# Patient Record
Sex: FEMALE
[De-identification: names, ages, dates, MRNs, and addresses within clinical notes are randomized per-mention and may not be internally consistent; named-entity substitution may affect disease eponyms.]

---

## 2018-04-03 ENCOUNTER — HOSPITAL ENCOUNTER (OUTPATIENT)
Dept: HOSPITAL 5 - SPVWC | Age: 53
Discharge: HOME | End: 2018-04-03
Attending: FAMILY MEDICINE
Payer: COMMERCIAL

## 2018-04-03 DIAGNOSIS — Z12.31: Primary | ICD-10-CM

## 2018-04-03 PROCEDURE — 77067 SCR MAMMO BI INCL CAD: CPT

## 2018-04-04 NOTE — MAMMOGRAPHY REPORT
BILATERAL DIGITAL SCREENING MAMMOGRAM WITH CAD:04/03/18 00:00:00



CLINICAL: Baseline screening.



FINDINGS: The breasts are mostly fatty with a few bilateral scattered 

fibroglandular densities.No mass, architectural distortion or 

suspicious calcifications.



IMPRESSION: No mammographic evidence of malignancy.



BI-RADS CATEGORY:  1 -- Negative



RECOMMENDATION: Routine mammographic screening in one year.





ACR BI-RADS MAMMOGRAPHIC CODES:

0 = Needs additional imaging evaluation; 1 = Negative; 2 = Benign; 3 = 

Probably benign; 4 = Suspicious; 5 = Malignant; 6 = Known biopsy-proven 

malignancy



COMMENT:

      1.   Dense breast tissue, i.e., adenosis, fibrocystic 

            changes, etc., may obscure an underlying neoplasm.

      2.   Approximately 10% of cancers are not detected with

            mammography.

      3.   A negative mammography report should not delay biopsy 

            if a clinically suspicious mass is present.

## 2022-08-23 ENCOUNTER — HOSPITAL ENCOUNTER (INPATIENT)
Dept: HOSPITAL 5 - ED | Age: 57
LOS: 3 days | Discharge: HOME | DRG: 286 | End: 2022-08-26
Attending: INTERNAL MEDICINE | Admitting: INTERNAL MEDICINE
Payer: COMMERCIAL

## 2022-08-23 DIAGNOSIS — E66.01: ICD-10-CM

## 2022-08-23 DIAGNOSIS — E78.5: ICD-10-CM

## 2022-08-23 DIAGNOSIS — Z87.891: ICD-10-CM

## 2022-08-23 DIAGNOSIS — F41.9: ICD-10-CM

## 2022-08-23 DIAGNOSIS — R07.89: Primary | ICD-10-CM

## 2022-08-23 DIAGNOSIS — I42.2: ICD-10-CM

## 2022-08-23 DIAGNOSIS — K21.9: ICD-10-CM

## 2022-08-23 DIAGNOSIS — F32.A: ICD-10-CM

## 2022-08-23 DIAGNOSIS — Z82.49: ICD-10-CM

## 2022-08-23 DIAGNOSIS — I11.0: ICD-10-CM

## 2022-08-23 DIAGNOSIS — I50.31: ICD-10-CM

## 2022-08-23 DIAGNOSIS — J45.909: ICD-10-CM

## 2022-08-23 PROCEDURE — 86850 RBC ANTIBODY SCREEN: CPT

## 2022-08-23 PROCEDURE — 80061 LIPID PANEL: CPT

## 2022-08-23 PROCEDURE — 85025 COMPLETE CBC W/AUTO DIFF WBC: CPT

## 2022-08-23 PROCEDURE — C1894 INTRO/SHEATH, NON-LASER: HCPCS

## 2022-08-23 PROCEDURE — 84443 ASSAY THYROID STIM HORMONE: CPT

## 2022-08-23 PROCEDURE — 82962 GLUCOSE BLOOD TEST: CPT

## 2022-08-23 PROCEDURE — 80053 COMPREHEN METABOLIC PANEL: CPT

## 2022-08-23 PROCEDURE — 80048 BASIC METABOLIC PNL TOTAL CA: CPT

## 2022-08-23 PROCEDURE — 71046 X-RAY EXAM CHEST 2 VIEWS: CPT

## 2022-08-23 PROCEDURE — 85610 PROTHROMBIN TIME: CPT

## 2022-08-23 PROCEDURE — 86901 BLOOD TYPING SEROLOGIC RH(D): CPT

## 2022-08-23 PROCEDURE — 71250 CT THORAX DX C-: CPT

## 2022-08-23 PROCEDURE — 36415 COLL VENOUS BLD VENIPUNCTURE: CPT

## 2022-08-23 PROCEDURE — 83880 ASSAY OF NATRIURETIC PEPTIDE: CPT

## 2022-08-23 PROCEDURE — 99285 EMERGENCY DEPT VISIT HI MDM: CPT

## 2022-08-23 PROCEDURE — 86900 BLOOD TYPING SEROLOGIC ABO: CPT

## 2022-08-23 PROCEDURE — 85730 THROMBOPLASTIN TIME PARTIAL: CPT

## 2022-08-23 PROCEDURE — 93458 L HRT ARTERY/VENTRICLE ANGIO: CPT

## 2022-08-23 PROCEDURE — 84439 ASSAY OF FREE THYROXINE: CPT

## 2022-08-23 PROCEDURE — C8929 TTE W OR WO FOL WCON,DOPPLER: HCPCS

## 2022-08-23 PROCEDURE — 81001 URINALYSIS AUTO W/SCOPE: CPT

## 2022-08-23 PROCEDURE — 36600 WITHDRAWAL OF ARTERIAL BLOOD: CPT

## 2022-08-23 PROCEDURE — 93005 ELECTROCARDIOGRAM TRACING: CPT

## 2022-08-23 PROCEDURE — 84484 ASSAY OF TROPONIN QUANT: CPT

## 2022-08-23 PROCEDURE — 82803 BLOOD GASES ANY COMBINATION: CPT

## 2022-08-23 PROCEDURE — 94644 CONT INHLJ TX 1ST HOUR: CPT

## 2022-08-23 PROCEDURE — 94640 AIRWAY INHALATION TREATMENT: CPT

## 2022-08-23 PROCEDURE — 93306 TTE W/DOPPLER COMPLETE: CPT

## 2022-08-23 NOTE — EMERGENCY DEPARTMENT REPORT
ED Chest Pain HPI





- General


Stated Complaint: DR REFERRAL/SOB AND WHEEZING


PUI?: No


Time Seen by Provider: 08/23/22 16:26


Source: patient


Mode of arrival: Ambulatory


Limitations: No Limitations





- History of Present Illness


Initial Comments: 





Dr Carlo [place]





57 yo comes in with a/c sob- worsening


diaphoretic


n/v- thought was gerd


obese





has card appnt 





was given inhalers for ? asthma





post menopausal





co for ACS


MD Complaint: chest pain





Heart Score





- HEART Score


History: Moderately suspicious


EKG: Non-specific


Age: 45-65


Risk factors: 1-2 risk factors


Troponin: < normal limit


HEART Score: 4





- EKG Read Time


Time EKG Completed: 16:30


EKG Read Time: 16:30





- Critical Actions


Critical Actions: 4-6 pts:12-16.6% risk of adverse cardiac event. Should be 

admitted





ED Review of Systems


ROS: 


Stated complaint: DR REFERRAL/SOB AND WHEEZING


Other details as noted in HPI








ED Past Medical Hx





- Past Medical History


Previous Medical History?: Yes


Hx Asthma: Yes


Additional medical history: obese- anxiety/depression





- Family History


Family history: other (dad dec lung ds/mom pos cad)





- Social History


Smoking Status: Former Smoker


Substance Use Type: Alcohol





ED Physical Exam





- General


Limitations: No Limitations


General appearance: alert, in no apparent distress





- Head


Head exam: Present: atraumatic, normocephalic





- Eye


Eye exam: Present: normal appearance





- ENT


ENT exam: Present: mucous membranes moist





- Neck


Neck exam: Present: normal inspection





- Respiratory


Respiratory exam: Present: normal lung sounds bilaterally.  Absent: respiratory 

distress





- Cardiovascular


Cardiovascular Exam: Present: regular rate, normal rhythm.  Absent: systolic 

murmur, diastolic murmur, rubs, gallop





- GI/Abdominal


GI/Abdominal exam: Present: soft, normal bowel sounds





- Extremities Exam


Extremities exam: Present: normal inspection





- Back Exam


Back exam: Present: normal inspection





- Neurological Exam


Neurological exam: Present: alert, oriented X3





- Psychiatric


Psychiatric exam: Present: normal affect, normal mood





- Skin


Skin exam: Present: warm, dry, intact, normal color.  Absent: rash


Critical care attestation.: 


If time is entered above; I have spent that time in minutes in the direct care 

of this critically ill patient, excluding procedure time.








ED Disposition


Clinical Impression: 


 Chest pain





Disposition: 30 STILL A PATIENT


Is pt being admited?: No


Does the pt Need Aspirin: No


Condition: Stable


Instructions:  Nonspecific Chest Pain, Adult

## 2022-08-23 NOTE — XRAY REPORT
CHEST 2 VIEWS 



INDICATION / CLINICAL INFORMATION:

Chest Pain.



COMPARISON: 

None available.



FINDINGS:



SUPPORT DEVICES: None.



HEART / MEDIASTINUM: No significant abnormality. 



LUNGS / PLEURA: No significant pulmonary or pleural abnormality. No pneumothorax. 



ADDITIONAL FINDINGS: No significant additional findings.



IMPRESSION:

1. No acute findings.



Signer Name: Dru Sinha MD 

Signed: 8/23/2022 5:05 PM

Workstation Name: [email or web address]

## 2022-08-24 LAB
ALBUMIN SERPL-MCNC: 4.7 G/DL (ref 3.9–5)
ALT SERPL-CCNC: 29 UNITS/L (ref 7–56)
APTT PPP: (no result) S
BACTERIA #/AREA URNS HPF: (no result) /HPF
BASOPHILS # (AUTO): 0.1 K/MM3 (ref 0–0.1)
BASOPHILS NFR BLD AUTO: 0.9 % (ref 0–1.8)
BUN SERPL-MCNC: 16 MG/DL (ref 7–17)
BUN/CREAT SERPL: 20 %
CALCIUM SERPL-MCNC: 9.6 MG/DL (ref 8.4–10.2)
EOSINOPHIL # BLD AUTO: 0.3 K/MM3 (ref 0–0.4)
EOSINOPHIL NFR BLD AUTO: 4.1 % (ref 0–4.3)
HCT VFR BLD CALC: 46.4 % (ref 30.3–42.9)
HEMOLYSIS INDEX: 16
HGB BLD-MCNC: 15.4 GM/DL (ref 10.1–14.3)
LYMPHOCYTES # BLD AUTO: 2.1 K/MM3 (ref 1.2–5.4)
LYMPHOCYTES NFR BLD AUTO: 27.3 % (ref 13.4–35)
MCHC RBC AUTO-ENTMCNC: 33 % (ref 30–34)
MCV RBC AUTO: 83 FL (ref 79–97)
MONOCYTES # (AUTO): 1.1 K/MM3 (ref 0–0.8)
MONOCYTES % (AUTO): 14.9 % (ref 0–7.3)
MUCOUS THREADS #/AREA URNS HPF: (no result) /HPF
PLATELET # BLD: 247 K/MM3 (ref 140–440)
RBC # BLD AUTO: 5.57 M/MM3 (ref 3.65–5.03)
RBC #/AREA URNS HPF: 8 /HPF (ref 0–6)
WBC #/AREA URNS HPF: 1 /HPF (ref 0–6)

## 2022-08-24 RX ADMIN — ENOXAPARIN SODIUM SCH MG: 100 INJECTION SUBCUTANEOUS at 21:58

## 2022-08-24 RX ADMIN — Medication SCH ML: at 21:58

## 2022-08-24 RX ADMIN — FAMOTIDINE SCH MG: 10 INJECTION, SOLUTION INTRAVENOUS at 21:58

## 2022-08-24 NOTE — EMERGENCY DEPARTMENT REPORT
ED General Adult HPI





- General


Chief complaint: Chest Pain


Stated complaint: DR REFERRAL/SOB AND WHEEZING


PUI?: No


Time Seen by Provider: 08/23/22 16:26


Source: patient


Mode of arrival: Ambulatory


Limitations: No Limitations





- History of Present Illness


Initial comments: 





pt reports she was sent by her MD for SOB and chest pain. pt reports has has 

intermittent SOB that she is treated for with an MDI. pt reports the SOB has 

been getting worse and more frequent and she is now having episodes of chest 

pain. pt reports she breaks out in a sweat while at rest sometimes. pt report 

the chest pain is left anterior chest. 


-: Gradual, month(s)


Severity scale (0 -10): 3


Consistency: intermittent


Improves with: none


Associated Symptoms: cough, shortness of breath.  denies: fever/chills


Treatments Prior to Arrival: none





- Related Data


                                  Previous Rx's











 Medication  Instructions  Recorded  Last Taken  Type


 


Azithromycin [Zithromax Z-GOPAL] 250 mg PO DAILY #6 08/24/22 Unknown Rx


 


Brompheniramine/Pseudoephed/Dm 5 ml PO Q6HR PRN #120 syrup 08/24/22 Unknown Rx





[Bromfed Dm Cough Syrup]    


 


methylPREDNISolone [Medrol 4MG 4 mg PO DAILY #1 08/24/22 Unknown Rx





DOSEPAK (21 tabs)]    











                                    Allergies











Allergy/AdvReac Type Severity Reaction Status Date / Time


 


No Known Allergies Allergy   Verified 08/23/22 16:35














ED Review of Systems


ROS: 


Stated complaint: DR REFERRAL/SOB AND WHEEZING


Other details as noted in HPI





Constitutional: denies: chills, fever


Eyes: denies: eye pain, eye discharge, vision change


ENT: denies: ear pain, throat pain


Respiratory: denies: cough, shortness of breath, wheezing


Cardiovascular: denies: chest pain, palpitations


Endocrine: no symptoms reported


Gastrointestinal: denies: abdominal pain, nausea, diarrhea


Genitourinary: denies: urgency, dysuria, discharge


Musculoskeletal: denies: back pain, joint swelling, arthralgia


Skin: denies: rash, lesions


Neurological: denies: headache, weakness, paresthesias


Psychiatric: denies: anxiety, depression


Hematological/Lymphatic: denies: easy bleeding, easy bruising





ED Past Medical Hx





- Past Medical History


Previous Medical History?: Yes


Hx Hypertension: No


Hx CVA: No


Hx Asthma: Yes


Additional medical history: obese- anxiety/depression





- Social History


Smoking Status: Former Smoker


Substance Use Type: Alcohol





- Medications


Home Medications: 


                                Home Medications











 Medication  Instructions  Recorded  Confirmed  Last Taken  Type


 


Azithromycin [Zithromax Z-GOPAL] 250 mg PO DAILY #6 08/24/22  Unknown Rx


 


Brompheniramine/Pseudoephed/Dm 5 ml PO Q6HR PRN #120 syrup 08/24/22  Unknown Rx





[Bromfed Dm Cough Syrup]     


 


methylPREDNISolone [Medrol 4MG 4 mg PO DAILY #1 08/24/22  Unknown Rx





DOSEPAK (21 tabs)]     














ED Physical Exam





- General


Limitations: No Limitations


General appearance: alert, in no apparent distress





- Head


Head exam: Present: atraumatic, normocephalic





- Eye


Eye exam: Present: normal appearance





- ENT


ENT exam: Present: mucous membranes moist





- Neck


Neck exam: Present: normal inspection





- Respiratory


Respiratory exam: Present: normal lung sounds bilaterally, wheezes.  Absent: 

respiratory distress





- Cardiovascular


Cardiovascular Exam: Present: regular rate, normal rhythm.  Absent: systolic 

murmur, diastolic murmur, rubs, gallop





- GI/Abdominal


GI/Abdominal exam: Present: soft, normal bowel sounds





- Extremities Exam


Extremities exam: Present: normal inspection





- Back Exam


Back exam: Present: normal inspection





- Neurological Exam


Neurological exam: Present: alert, oriented X3





- Psychiatric


Psychiatric exam: Present: normal affect, normal mood





- Skin


Skin exam: Present: warm, dry, intact, normal color.  Absent: rash





ED Course





                                   Vital Signs











  08/23/22 08/24/22





  16:27 07:56


 


Temperature 98.9 F 


 


Pulse Rate 102 H 


 


Pulse Rate [  106 H





Anterior  





Bilateral  





Throughout]  


 


Respiratory 26 H 





Rate  


 


Respiratory  20





Rate [Anterior  





Bilateral  





Throughout]  


 


Blood Pressure 209/105 





[Right]  


 


O2 Sat by Pulse 97 





Oximetry  














ED Medical Decision Making





- Lab Data


Result diagrams: 


                                 08/24/22 02:08





                                 08/23/22 23:40





- EKG Data


-: EKG Interpreted by Me


EKG shows normal: sinus rhythm





- EKG Data


Interpretation: LVH





- Radiology Data


Radiology results: report reviewed, image reviewed





- Medical Decision Making





work up showed normal x ray , BP controlled rt given , will refer to card for 

echo rule out HF , and pulmonary to be evaluated for COPD chornic brnchitis 


Critical care attestation.: 


If time is entered above; I have spent that time in minutes in the direct care 

of this critically ill patient, excluding procedure time.








ED Disposition


Clinical Impression: 


 SOB (shortness of breath), Bronchitis, Uncontrolled hypertension





Disposition: 01 HOME / SELF CARE / HOMELESS


Is pt being admited?: No


Does the pt Need Aspirin: No


Condition: Stable


Instructions:  Nonspecific Chest Pain, Adult, Chronic Bronchitis (ED), 

Hypertension (ED), Preventing Hypertension, Shortness of Breath, Adult, 

Easy-to-Read, Upper Respiratory Infection, Adult


Referrals: 


PRIMARY CARE,MD [Primary Care Provider] - 3-5 Days


MAURA BOGGS MD [Staff Physician] - 3-5 Days

## 2022-08-24 NOTE — CONSULTATION
History of Present Illness


Consult date: 08/24/22


Requesting physician: KATHIE MCMAHON


Consult reason: chest pain, known to you


History of present illness: 





Patient is a 56-year-old female with a history of obesity, past tobacco use, and

family history of premature CAD who came to the ED after being sent by Dr. Moran

from a office due to abnormal EKG, shortness of breath, and chest pain that has 

been progressively worsening for several months.  Patient also reports 75 pound 

weight gain in the last year, dyspnea on exertion, and diaphoresis at time.  She

currently denies with apnea, PND nausea, vomiting, palpitations.  Cardiology is 

consulted for chest pain and shortness of breath.





Past History


Past Medical History: No medical history


Past Surgical History: No surgical history


Social history: smoking (Former 30-year smoker)


Family history: CAD





Medications and Allergies


                                    Allergies











Allergy/AdvReac Type Severity Reaction Status Date / Time


 


No Known Allergies Allergy   Verified 08/23/22 16:35











                                Home Medications











 Medication  Instructions  Recorded  Confirmed  Last Taken  Type


 


Azithromycin [Zithromax Z-GOPAL] 250 mg PO DAILY #6 08/24/22  Unknown Rx


 


Brompheniramine/Pseudoephed/Dm 5 ml PO Q6HR PRN #120 syrup 08/24/22  Unknown Rx





[Bromfed Dm Cough Syrup]     


 


methylPREDNISolone [Medrol 4MG 4 mg PO DAILY #1 08/24/22  Unknown Rx





DOSEPAK (21 tabs)]     











Active Meds: 


Active Medications





Acetaminophen (Acetaminophen 325 Mg Tab)  650 mg PO Q4H PRN


   PRN Reason: Pain MILD(1-3)/Fever >100.5/HA


Docusate Sodium (Docusate Sodium 100 Mg Cap)  100 mg PO BID PRN


   PRN Reason: Constipation


Famotidine (Famotidine 20 Mg/2 Ml Inj)  20 mg IV BID TAL


Sodium Chloride (Nacl 0.9% 500 Ml)  500 mls @ 50 mls/hr IV AS DIRECT TAL


   Stop: 08/24/22 21:59


Morphine Sulfate (Morphine 2 Mg/1 Ml Inj)  2 mg IV Q4H PRN


   PRN Reason: Pain, Moderate (4-6)


Morphine Sulfate (Morphine 4 Mg/1 Ml Inj)  4 mg IV Q4H PRN


   PRN Reason: Pain , Severe (7-10)


Naloxone HCl (Naloxone 0.4 Mg/1 Ml Inj)  0.1 mg IV Q2MIN PRN


   PRN Reason: Res Rate </= 8 or 02 SAT < 92%


Ondansetron HCl (Ondansetron 4 Mg/2 Ml Inj)  4 mg IV Q8H PRN


   PRN Reason: Nausea And Vomiting


Sodium Chloride (Sodium Chloride 0.9% 10 Ml Flush Syringe)  10 ml IV BID TAL


Sodium Chloride (Sodium Chloride 0.9% 10 Ml Flush Syringe)  10 ml IV PRN PRN


   PRN Reason: LINE FLUSH











Review of Systems


Constitutional: weight gain


Ears, nose, mouth and throat: no sinus pressure, no sinus pain


Cardiovascular: chest pain, shortness of breath, dyspnea on exertion, no 

orthopnea, no palpitations, no edema


Respiratory: shortness of breath, dyspnea on exertion


Gastrointestinal: no abdominal pain, no nausea, no vomiting


Musculoskeletal: no neck stiffness, no neck pain, no shooting arm pain


Integumentary: no rash, no pruritis, no redness


Neurological: no head injury, no transient paralysis


Psychiatric: no anxiety, no memory loss


Endocrine: no cold intolerance, no heat intolerance


Hematologic/Lymphatic: no easy bruising, no easy bleeding





Physical Examination


                                   Vital Signs











Temp Pulse Resp BP Pulse Ox


 


 98.9 F   102 H  26 H  209/105   97 


 


 08/23/22 16:27  08/23/22 16:27  08/23/22 16:27  08/23/22 16:27  08/23/22 16:27











General appearance: no acute distress


Neck: Positive: trachea midline


Cardiac: Positive: Reg Rate and Rhythm


Lungs: Positive: Normal Breath Sounds


Neuro: Positive: Grossly Intact


Abdomen: Positive: Soft


Skin: Negative: Rash, Suspicious Lesions, Ulceration


Extremities: Present: upper extr. pulses.  Absent: edema





Results





                                 08/24/22 02:08





                                 08/23/22 23:40


                                 Cardiac Enzymes











  08/23/22 Range/Units





  23:40 


 


AST  19  (5-40)  units/L








                                       CBC











  08/24/22 Range/Units





  02:08 


 


WBC  7.7  (4.5-11.0)  K/mm3


 


RBC  5.57 H  (3.65-5.03)  M/mm3


 


Hgb  15.4 H  (10.1-14.3)  gm/dl


 


Hct  46.4 H  (30.3-42.9)  %


 


Plt Count  247  (140-440)  K/mm3


 


Lymph # (Auto)  2.1  (1.2-5.4)  K/mm3


 


Mono # (Auto)  1.1 H  (0.0-0.8)  K/mm3


 


Eos # (Auto)  0.3  (0.0-0.4)  K/mm3


 


Baso # (Auto)  0.1  (0.0-0.1)  K/mm3








                          Comprehensive Metabolic Panel











  08/23/22 Range/Units





  23:40 


 


Sodium  142  (137-145)  mmol/L


 


Potassium  3.9  (3.6-5.0)  mmol/L


 


Chloride  99.3  ()  mmol/L


 


Carbon Dioxide  26  (22-30)  mmol/L


 


BUN  16  (7-17)  mg/dL


 


Creatinine  0.8  (0.6-1.2)  mg/dL


 


Glucose  224 H  ()  mg/dL


 


Calcium  9.6  (8.4-10.2)  mg/dL


 


AST  19  (5-40)  units/L


 


ALT  29  (7-56)  units/L


 


Alkaline Phosphatase  93  ()  units/L


 


Total Protein  7.2  (6.3-8.2)  g/dL


 


Albumin  4.7  (3.9-5)  g/dL














- Imaging and Cardiology


Echo: pending


Cardiac cath: pending





EKG interpretations





- Telemetry


EKG Rhythm: Sinus Rhythm





- EKG


Sinus rhythms and dysrhythmias: sinus rhythm


Chamber hypertrophy or enlargement: left ventricular hypertro





Assessment and Plan





Patient is a 56-year-old female with a history of obesity, past tobacco use, and

family history of premature CAD who came to the ED after being sent by Dr. Mroan

from a office due to abnormal EKG, shortness of breath, and chest pain that has 

been progressively worsening for several months





Chest pain


Shortness of breath


Bronchitis


Hypertension








Plan:


EKG shows sinus rhythm with LVH no acute ischemic changes.  Troponins negative 

x2.  AMI ruled out


BNP negative and patient appears euvolemic on exam.  Patient is not clinically 

in heart failure


Echo pending


Due to patient's progressively worsening symptoms we will plan for cardiac cath 

in the a.m.  Patient to be n.p.o. after midnight


Discussed plan of care with patient and family member who is at bedside both 

verbalized understanding and agreement





Patient seen in conjunction with Dr. Saldivar who agrees with this plan of care








- Patient Problems


(1) Chest pain


Current Visit: Yes   Status: Acute   





(2) Bronchitis


Current Visit: Yes   Status: Acute   





(3) SOB (shortness of breath)


Current Visit: Yes   Status: Acute   





(4) Uncontrolled hypertension


Current Visit: Yes   Status: Acute

## 2022-08-24 NOTE — HISTORY AND PHYSICAL REPORT
History of Present Illness


Date of examination: 22


Date of admission: 


2022


Chief complaint: 


Chest pain, shortness of breath and wheezing.


Sent by her MD for admission and further evaluation





History of present illness: 


56-year-old morbidly obese female patient with history of chronic tobacco use 

quit many years ago significant family history of premature coronary artery 

disease, was sent by her cardiologist to be admitted for further evaluation and 

management of her abnormal EKG and shortness of breath, in the setting of 

multiple risk factors, for further evaluation and possible left heart 

catheterization tomorrow.


Patient was evaluated by ER physician first 2 sets of cardiac enzymes were 

negative and EKG without any ST-T changes.


Chest x-ray no acute abnormality noted,At the time of my evaluation patient 

denies any chest pain or shortness of breath


Patient denies orthopnea or paroxysmal nocturnal dyspnea


Patient however complains of intermittent generalized sweating that last for few

minutes, not associated with chest pain, probably hot flashes








Past History


Past Medical History: No medical history


Past Surgical History:  (X3)


Social history: smoking (Quit many years ago), full code.  denies: alcohol 

abuse, prescription drug abuse


Family history: CAD





Medications and Allergies


                                    Allergies











Allergy/AdvReac Type Severity Reaction Status Date / Time


 


No Known Allergies Allergy   Verified 22 16:35











                                Home Medications











 Medication  Instructions  Recorded  Confirmed  Last Taken  Type


 


Albuterol Mdi (or & Nicu Only) 2 puff IH QID PRN 22 2 Days Ago 

History





[ProAir HFA Inhaler]    ~22 


 


Budesonide/Formoterol Fumarate 10.2 gm IH BID 22 2 Days Ago 

History





[Symbicort 160-4.5 Mcg Inhaler]    ~22 


 


Bupropion HCl [Wellbutrin XL] 300 mg PO QAM 22 2 Days Ago History





    ~22 


 


Duloxetine HCl [Drizalma Sprinkle] 40 mg PO DAILY 22 2 Days Ago 

History





    ~22 











Active Meds: 


Active Medications





Sodium Chloride (Nacl 0.9% 500 Ml)  500 mls @ 50 mls/hr IV AS DIRECT TAL


   Stop: 22 21:59











Review of Systems


Constitutional: no weight loss, no weight gain, no fatigue, no malaise


Ears, nose, mouth and throat: no nasal congestion, no nasal discharge


Cardiovascular: chest pain, shortness of breath, no orthopnea, no palpitations, 

no paroxysmal nocturnal dyspnea


Respiratory: shortness of breath, no cough, no hemoptysis


Gastrointestinal: no abdominal pain, no nausea, no vomiting


Genitourinary Female: no flank pain, no dysuria


Musculoskeletal: no myalgias, no arthritis


Integumentary: no rash, no lesions


Neurological: no seizures, no syncope


Psychiatric: no anxiety, no depression


Endocrine: no cold intolerance, no heat intolerance


Hematologic/Lymphatic: no easy bruising, no easy bleeding


Allergic/Immunologic: no urticaria, no allergic rhinitis





Exam





- Constitutional


Vitals: 


                                        











Temp Pulse Resp BP Pulse Ox


 


 98.9 F   106 H  20   209/105   98 


 


 22 16:27  22 07:56  22 07:56  22 16:27  22 08:34











General appearance: Present: no acute distress, well-nourished, obese





- EENT


Eyes: Present: PERRL, EOM intact





- Neck


Neck: Present: supple, normal ROM





- Respiratory


Respiratory effort: normal


Respiratory: bilateral: diminished, negative: rales, rhonchi, wheezing





- Cardiovascular


Rhythm: regular


Heart Sounds: Present: S1 & S2





- Extremities


Extremities: no ischemia, No edema





- Abdominal


General gastrointestinal: Present: soft, non-tender, non-distended, normal bowel

sounds





- Integumentary


Integumentary: Present: clear, warm





- Musculoskeletal


Musculoskeletal: strength equal bilaterally, generalized weakness





- Psychiatric


Psychiatric: appropriate mood/affect, agitated





- Neurologic


Neurologic: moves all extremities





HEART Score





- HEART Score


EKG: Non-specific


Age: 45-65


Risk factors: 1-2 risk factors


Troponin: 


                                        











Troponin T  < 0.010 ng/mL (0.00-0.029)   22  03:48    











Troponin: < normal limit





- Critical Actions


Critical Actions: 4-6 pts:12-16.6% risk of adverse cardiac event. Should be 

admitted





Results





- Labs


CBC & Chem 7: 


                                 22 02:08





                                 22 23:40


Labs: 


                              Abnormal lab results











  22 Range/Units





  23:40 02:08 


 


RBC   5.57 H  (3.65-5.03)  M/mm3


 


Hgb   15.4 H  (10.1-14.3)  gm/dl


 


Hct   46.4 H  (30.3-42.9)  %


 


Mono % (Auto)   14.9 H  (0.0-7.3)  %


 


Mono # (Auto)   1.1 H  (0.0-0.8)  K/mm3


 


Glucose  224 H   ()  mg/dL














Assessment and Plan


--Chest pain:


Associated with shortness of breath, past history of tobacco use, family history

of premature coronary artery disease


Serial cardiac enzymes, serial EKG, echocardiogram for LV function ejection 

fraction


Cardiology evaluation noted and appreciated, possible left heart catheterization

tomorrow


N.p.o. from midnight





--Family history of premature coronary artery disease;


Important risk factor, cardiac work-up in progress





- Morbid obesity; BMI 48.8


Advised dietary modification, exercise as tolerated and weight reduction


When medically stable





-- Dyspnea;


Oxygen titrate O2 sats to more than 90%


Echocardiogram for LV function ejection fraction


Chest x-ray no acute abnormality noted





-- DVT prophylaxis; subcu Lovenox





-- Full CODE STATUS





Advance care planning; +30 minutes


I discussed with the patient her condition, tests and reports


Discussed the diagnosis, I discussed the significance of cardiac testing, 

echocardiogram, serial cardiac enzymes, serial EKG


The significance of left heart catheterization tomorrow, and the treatment plan.

 Patient had some questions


Answered all of them, patient verbalized understanding





Preventive health care counseling; +30 minutes


Strongly advised, diet modification, exercise as tolerated and weight reduction 

when you are medically stable


I also encouraged lifestyle changes, bariatric surgical consultation as 

outpatient for different options of weight reduction program


When you are medically stable, patient verbalized understanding





Closely monitor the patient and adjust the management as needed


Follow echocardiogram, heart cath tomorrow and adjust her management as needed


Plan of care reviewed with the patient and her nurse

## 2022-08-25 LAB
APTT BLD: 33.5 SEC. (ref 24.2–36.6)
BASOPHILS # (AUTO): 0 K/MM3 (ref 0–0.1)
BASOPHILS NFR BLD AUTO: 0.5 % (ref 0–1.8)
BUN SERPL-MCNC: 12 MG/DL (ref 7–17)
BUN/CREAT SERPL: 17 %
CALCIUM SERPL-MCNC: 9.3 MG/DL (ref 8.4–10.2)
EOSINOPHIL # BLD AUTO: 0.2 K/MM3 (ref 0–0.4)
EOSINOPHIL NFR BLD AUTO: 4.3 % (ref 0–4.3)
HCT VFR BLD CALC: 43.6 % (ref 30.3–42.9)
HDLC SERPL-MCNC: 49 MG/DL (ref 40–59)
HEMOLYSIS INDEX: 21
HGB BLD-MCNC: 14.8 GM/DL (ref 10.1–14.3)
INR PPP: 0.95 (ref 0.87–1.13)
LYMPHOCYTES # BLD AUTO: 1.8 K/MM3 (ref 1.2–5.4)
LYMPHOCYTES NFR BLD AUTO: 32 % (ref 13.4–35)
MCHC RBC AUTO-ENTMCNC: 34 % (ref 30–34)
MCV RBC AUTO: 83 FL (ref 79–97)
MONOCYTES # (AUTO): 0.6 K/MM3 (ref 0–0.8)
MONOCYTES % (AUTO): 10.4 % (ref 0–7.3)
PLATELET # BLD: 198 K/MM3 (ref 140–440)
RBC # BLD AUTO: 5.25 M/MM3 (ref 3.65–5.03)
T4 FREE SERPL-MCNC: 1.07 NG/DL (ref 0.76–1.46)

## 2022-08-25 PROCEDURE — 4A023N7 MEASUREMENT OF CARDIAC SAMPLING AND PRESSURE, LEFT HEART, PERCUTANEOUS APPROACH: ICD-10-PCS | Performed by: INTERNAL MEDICINE

## 2022-08-25 PROCEDURE — B2151ZZ FLUOROSCOPY OF LEFT HEART USING LOW OSMOLAR CONTRAST: ICD-10-PCS | Performed by: INTERNAL MEDICINE

## 2022-08-25 PROCEDURE — B2111ZZ FLUOROSCOPY OF MULTIPLE CORONARY ARTERIES USING LOW OSMOLAR CONTRAST: ICD-10-PCS | Performed by: INTERNAL MEDICINE

## 2022-08-25 RX ADMIN — ENOXAPARIN SODIUM SCH MG: 100 INJECTION SUBCUTANEOUS at 21:13

## 2022-08-25 RX ADMIN — NITROGLYCERIN ONE MLS: 20 INJECTION INTRAVENOUS at 09:16

## 2022-08-25 RX ADMIN — ARFORMOTEROL TARTRATE SCH MCG: 15 SOLUTION RESPIRATORY (INHALATION) at 20:20

## 2022-08-25 RX ADMIN — MIDAZOLAM ONE MG: 1 INJECTION INTRAMUSCULAR; INTRAVENOUS at 09:10

## 2022-08-25 RX ADMIN — VERAPAMIL HYDROCHLORIDE ONE MG: 2.5 INJECTION INTRAVENOUS at 09:16

## 2022-08-25 RX ADMIN — Medication SCH ML: at 11:09

## 2022-08-25 RX ADMIN — MIDAZOLAM ONE MG: 1 INJECTION INTRAMUSCULAR; INTRAVENOUS at 08:59

## 2022-08-25 RX ADMIN — IPRATROPIUM BROMIDE AND ALBUTEROL SULFATE SCH AMPUL: .5; 3 SOLUTION RESPIRATORY (INHALATION) at 20:20

## 2022-08-25 RX ADMIN — METOPROLOL SUCCINATE SCH MG: 25 TABLET, FILM COATED, EXTENDED RELEASE ORAL at 11:09

## 2022-08-25 RX ADMIN — Medication SCH ML: at 21:13

## 2022-08-25 RX ADMIN — FENTANYL CITRATE ONE MCG: 50 INJECTION, SOLUTION INTRAMUSCULAR; INTRAVENOUS at 09:10

## 2022-08-25 RX ADMIN — VERAPAMIL HYDROCHLORIDE ONE MG: 2.5 INJECTION INTRAVENOUS at 09:00

## 2022-08-25 RX ADMIN — FENTANYL CITRATE ONE MCG: 50 INJECTION, SOLUTION INTRAMUSCULAR; INTRAVENOUS at 08:59

## 2022-08-25 RX ADMIN — HEPARIN SODIUM ONE UNIT: 1000 INJECTION, SOLUTION INTRAVENOUS; SUBCUTANEOUS at 09:16

## 2022-08-25 RX ADMIN — FAMOTIDINE SCH MG: 10 INJECTION, SOLUTION INTRAVENOUS at 11:08

## 2022-08-25 RX ADMIN — BUDESONIDE SCH MG: 0.5 INHALANT RESPIRATORY (INHALATION) at 20:20

## 2022-08-25 RX ADMIN — LIDOCAINE HYDROCHLORIDE ONE ML: 10 INJECTION, SOLUTION INFILTRATION; PERINEURAL at 08:59

## 2022-08-25 RX ADMIN — NITROGLYCERIN ONE MLS: 20 INJECTION INTRAVENOUS at 09:00

## 2022-08-25 RX ADMIN — LIDOCAINE HYDROCHLORIDE ONE ML: 10 INJECTION, SOLUTION INFILTRATION; PERINEURAL at 09:15

## 2022-08-25 RX ADMIN — FAMOTIDINE SCH MG: 10 INJECTION, SOLUTION INTRAVENOUS at 21:12

## 2022-08-25 RX ADMIN — HEPARIN SODIUM ONE UNIT: 1000 INJECTION, SOLUTION INTRAVENOUS; SUBCUTANEOUS at 09:00

## 2022-08-25 NOTE — PROGRESS NOTE
Assessment and Plan





Patient is a 56-year-old female with a history of obesity, past tobacco use, and

family history of premature CAD who came to the ED after being sent by Dr. Moran

from a office due to abnormal EKG, shortness of breath, and chest pain that has 

been progressively worsening for several months





Chest pain


Shortness of breath


Acute diastolic dysfunction


Hypertrophic cardiomyopathy


Bronchitis


Hypertension


Obesity





Cardiac cath 8/25/2022-left main patent.  LAD large in pain.  Circumflex large 

and patent.  RCA patent.  Per dynamic LV with Brockenbrough phenomenon with 

increased gradient of 60mm with PVC.  Recommend medical management.


Echo 8/24/2022-EF 65 to 70%.  Mild to moderate concentric LVH.  Mild diastolic 

dysfunction is present impaired relaxation pattern.  Aortic valve not well 

visualized.  Trace tricuspid regurgitation.





Plan:


EKG shows sinus rhythm with LVH no acute ischemic changes.  Troponins negative 

x2.  AMI ruled out


Cardiac cath results noted above.  Patient has cardiomyopathy.  Will initiate 

metoprolol XL 25 mg p.o. daily


Due to patient's complaint of shortness of breath consult placed to pulmonology


Discussed plan of care with patient and family member who is at bedside both 

verbalized understanding and agreement


Cardiac status otherwise stable for discharge





Patient has a follow-up appointment with Dr. Moran, [facility] heart 

specialist, on 9/16/2022 at 3 PM in our [place] location.  Phone #[phone]


Patient seen in conjunction with Dr. Saldivar who agrees with this plan of care








- Patient Problems


(1) Chest pain


Current Visit: Yes   Status: Acute   





(2) Bronchitis


Current Visit: Yes   Status: Acute   





(3) SOB (shortness of breath)


Current Visit: Yes   Status: Acute   





(4) Diastolic dysfunction


Current Visit: Yes   Status: Acute   





(5) Hypertrophic cardiomyopathy


Current Visit: Yes   Status: Acute   





(6) Obesity


Current Visit: Yes   Status: Acute   





Subjective


Date of service: 08/25/22


Principal diagnosis: Acute diastolic dysfunction, shortness of breath


Interval history: 





Patient for cardiac cath this a.m.


Sinus on monitor with no events





Objective


                                   Vital Signs











  Temp Pulse Resp BP Pulse Ox


 


 08/25/22 05:01  98.0 F  93 H  18  132/69  92


 


 08/24/22 23:54  97.9 F  69  19  129/64  95


 


 08/24/22 23:02   76   


 


 08/24/22 22:24   94 H  22  131/78  97


 


 08/24/22 22:00      98


 


 08/24/22 19:41  98.1 F  90  18  125/79  96


 


 08/24/22 17:56  97.4 F L   19  125/68 


 


 08/24/22 15:46     131/78  91


 


 08/24/22 15:30     131/78  94


 


 08/24/22 15:16     131/78  90


 


 08/24/22 15:00     131/78  89


 


 08/24/22 14:46     131/78  89


 


 08/24/22 14:30     131/78  93


 


 08/24/22 14:16     131/78  93


 


 08/24/22 14:00     131/78  92


 


 08/24/22 13:46     131/78  93


 


 08/24/22 13:30     131/78  95


 


 08/24/22 13:16     131/78  94


 


 08/24/22 13:00     144/98  94


 


 08/24/22 12:46     131/78  94


 


 08/24/22 12:30     131/78  95


 


 08/24/22 12:16     131/78  91


 


 08/24/22 12:00     131/78  93


 


 08/24/22 11:46     131/78  90


 


 08/24/22 11:30     131/78  91


 


 08/24/22 11:16     131/78  91


 


 08/24/22 11:00     131/78  94


 


 08/24/22 10:46     131/78  92














- Physical Examination


General: No Apparent Distress


Neck: Positive: trachea midline


Cardiac: Positive: Reg Rate and Rhythm


Lungs: Positive: Normal Breath Sounds


Neuro: Positive: Grossly Intact


Abdomen: Positive: Soft


Skin: Negative: Rash, Suspicious Lesions, Ulceration


Extremities: Present: upper extr. pulses.  Absent: edema





- Labs and Meds


                                   Coagulation











  08/25/22 Range/Units





  05:34 


 


PT  13.7  (12.2-14.9)  Sec.


 


INR  0.95  (0.87-1.13)  


 


APTT  33.5  (24.2-36.6)  Sec.








                                     Lipids











  08/25/22 Range/Units





  05:34 


 


Triglycerides  201 H  (2-149)  mg/dL


 


Cholesterol  215 H  ()  mg/dL


 


HDL Cholesterol  49  (40-59)  mg/dL


 


Cholesterol/HDL Ratio  4.38  %








                                       CBC











  08/25/22 Range/Units





  05:34 


 


WBC  5.7  (4.5-11.0)  K/mm3


 


RBC  5.25 H  (3.65-5.03)  M/mm3


 


Hgb  14.8 H  (10.1-14.3)  gm/dl


 


Hct  43.6 H  (30.3-42.9)  %


 


Plt Count  198  (140-440)  K/mm3


 


Lymph # (Auto)  1.8  (1.2-5.4)  K/mm3


 


Mono # (Auto)  0.6  (0.0-0.8)  K/mm3


 


Eos # (Auto)  0.2  (0.0-0.4)  K/mm3


 


Baso # (Auto)  0.0  (0.0-0.1)  K/mm3








                          Comprehensive Metabolic Panel











  08/25/22 Range/Units





  05:34 


 


Sodium  141  (137-145)  mmol/L


 


Potassium  4.0  (3.6-5.0)  mmol/L


 


Chloride  101.3  ()  mmol/L


 


Carbon Dioxide  29  (22-30)  mmol/L


 


BUN  12  (7-17)  mg/dL


 


Creatinine  0.7  (0.6-1.2)  mg/dL


 


Glucose  169 H  ()  mg/dL


 


Calcium  9.3  (8.4-10.2)  mg/dL














- Imaging and Cardiology


Echo: report reviewed


Cardiac cath: report reviewed





- Telemetry


EKG Rhythm: Sinus Rhythm





- EKG


Sinus rhythms and dysrhythmias: sinus rhythm


Chamber hypertrophy or enlargement: left ventricular hypertro

## 2022-08-25 NOTE — ELECTROCARDIOGRAPH REPORT
[facility]

                                       

Test Date:    2022               Test Time:    16:39:04

Pat Name:     JOSE MIGUEL VALENCIA          Department:   

Patient ID:   [id number]          Room:         A460

Gender:       F                        Technician:   KRUPA

:          1965               Requested By: NASREEN SINGH

Order Number: [id number]             Reading MD:   Angel Saldivar

                                 Measurements

Intervals                              Axis          

Rate:         68                       P:            64

NM:           146                      QRS:          -25

QRSD:         106                      T:            118

QT:           385                                    

QTc:          411                                    

                           Interpretive Statements

Sinus rhythm

Probable left atrial enlargement

LVH with secondary repolarization abnormality

No previous ECG available for comparison

Electronically Signed On 2022 9:45:32 EDT by Angel Saldivar

## 2022-08-25 NOTE — CAT SCAN REPORT
CT CHEST WITHOUT CONTRAST



INDICATION / CLINICAL INFORMATION: Progressive dyspnea, former smoker.



TECHNIQUE: Axial CT images were obtained through the chest without contrast. All CT scans at this [facility]
ation are performed using CT dose reduction for ALARA by means of automated exposure control. 



COMPARISON: None available.



FINDINGS:



HEART: Tiny pericardial effusion..

CORONARY ARTERY CALCIFICATION: Present -- Mild.

THORACIC AORTA: No significant abnormality. 

MEDIASTINUM / AVANI: No significant abnormality.

PLEURA: No pleural effusion. No pneumothorax.

LUNGS: 4 mm nodule right lower lobe.. There is extensive linear opacity involving both lung bases. Th
ere is a 4 mm nodule within the medial aspect of the right lower lobe. Some intermixed groundglass de
nsities and subtle interstitial thickening is identified within the peripheral aspects of both lower 
lungs.



ADDITIONAL FINDINGS: None.



UPPER ABDOMEN: Fatty liver. Small type I hiatal hernia.



SKELETAL SYSTEM: No significant abnormality.



IMPRESSION:

1. Multiple pulmonary nodules right lower lung, as above.

2. Increased linear opacities bilaterally involving both lung bases are probably related to postinfla
mmatory scarring.

3. Increased mixed groundglass and interstitial thickening along the peripheral aspects of both lower
 lungs left slightly worse than right. This could represent early interstitial lung disease versus at
ypical appearance of pneumonia.



INCIDENTAL PULMONARY NODULE RECOMMENDATIONS 



Solid Nodule size <6 mm -- Single or Multiple



- Low Risk Patient: No routine follow-up 



- High Risk Patient: Optional CT at 12 months



Note  These recommendations do not apply to lung cancer screening, patients with immunosuppression, o
r patients with known primary cancer.



Note  Newly detected indeterminate nodule in persons 35 years of age or older. Persons under the age 
of 35 should not receive follow-up unless there is a known primary cancer.



Low Risk Patient -- minimal or absent history of smoking and of other known risk factors.

High Risk Patient -- history of smoking or of other known risk factors.



Nodule dimensions are average of long and short axes, rounded to the nearest millimeter.



Based on 2017 Fleischner Society Guidelines found in Radiology 2017 284:228-243. https://doi.org/10.1
148/radiol.7530522006



Signer Name: Mateo Fernando MD 

Signed: 8/25/2022 7:06 PM

Workstation Name: [email or web address]

## 2022-08-25 NOTE — CARDIAC CATHERIZATION REPORT
DATE OF SERVICE: 08/25/2022



LEFT HEART CATHETERIZATION



CLINICAL INFORMATION:  A 56-year-old female with morbid obesity, has refractory 

shortness of breath and some chest discomfort despite medical management for 

unstable angina and is here for left heart catheterization.  Echocardiogram 

shows normal LV function with moderate LVH.  The patient was done with moderate 

sedation started at 9:10, finished at 9:25 with 15 minutes of moderate sedation.



DESCRIPTION OF PROCEDURE:  Procedure was done via the right radial artery, 

sterile technique and local anesthesia.  A 6-Ethiopian radial sheath inserted.  

Left system engaged with JL3.5 catheter.  Left main is large and patent, 

bifurcates into large LAD that is patent.  Diagonal 1 and diagonal 2 medium 

caliber vessels. Circumflex large caliber vessel, patent.  OM 1, 2 and 3 are 

large caliber vessels, patent.  RCA is engaged with JR4, is a large dominant 

vessel, is patent.  PDA, PLV are medium caliber vessels, patent.  LV gram done 

in [place] shows hyperdynamic LVEF 65%, LVEDP of 20 mmHg, LV is 165, aortic is 

156/73.  No significant gradient across the aortic valve, but the patient with 

PVCs had Brockenbrough phenomenon with increased gradient of 60 mm with PVC.  

All catheters were taken over guidewire.  A 6-Ethiopian radial sheath was 

discontinued.  Radial band applied.  No hematoma, no bleeding.



SUMMARY:

1.  Left main patent.  LAD large, patent. Circ large, patent.  OM 1, 2 and 3 

patent. RCA patent.

2.  Hyperdynamic LV with Brockenbrough phenomenon with 40 mm hg increased 

gradient with PVC. Medical management.







DD: 08/25/2022 09:31 AM

DT: 08/25/2022 09:51 AM

TID: [id number] RECEIPT: [id number]

TOREY/LINDSEY/OLIVER LEDEZMAD

## 2022-08-25 NOTE — CONSULTATION
History of Present Illness


Consult date: 22


Requesting physician: ALEXANDRA PERKINS


Reason for consult: dyspnea


History of present illness: 





55 y/o obese female with shortness of breath for the past year.  Morbidly obese 

and states that she has gained 50+lbs unintentionally in the last year as well. 

 at bedside.  patient is very anxious.  states that she feels clammy now 

during the interview.  Quit smoking 15 years ago.  Made herself wheeze on exam. 

(Upper airway noise)





Past History


Past Medical History: No medical history


Past Surgical History:  (X3)


Social history: smoking (Quit many years ago), full code.  denies: alcohol 

abuse, prescription drug abuse


Family history: CAD





Medications and Allergies


                                    Allergies











Allergy/AdvReac Type Severity Reaction Status Date / Time


 


No Known Allergies Allergy   Verified 22 16:35











                                Home Medications











 Medication  Instructions  Recorded  Confirmed  Last Taken  Type


 


Albuterol Mdi (or & Nicu Only) 2 puff IH QID PRN 22 2 Days Ago 

History





[ProAir HFA Inhaler]    ~22 


 


Budesonide/Formoterol Fumarate 10.2 gm IH BID 22 2 Days Ago 

History





[Symbicort 160-4.5 Mcg Inhaler]    ~22 


 


Bupropion HCl [Wellbutrin XL] 300 mg PO QAM 22 2 Days Ago History





    ~22 


 


Duloxetine HCl [Drizalma Sprinkle] 40 mg PO DAILY 22 2 Days Ago 

History





    ~22 











Active Meds: 


Active Medications





Acetaminophen (Acetaminophen 325 Mg Tab)  650 mg PO Q4H PRN


   PRN Reason: Pain MILD(1-3)/Fever >100.5/HA


Hydrocodone Bitart/Acetaminophen (Hydrocodone/Acetaminophen 5-325 Mg Tab)  1 

each PO Q4H PRN


   PRN Reason: Pain, Moderate (4-6)


Albuterol (Albuterol 2.5 Mg/3 Ml Nebu)  2.5 mg IH Q4HRT PRN


   PRN Reason: Shortness Of Breath


Albuterol/Ipratropium (Ipratropium/Albuterol Sulfate 3 Ml Ampul.Neb)  1 ampul IH

TIDRT TAL


Arformoterol Tartrate (Arformoterol 15 Mcg/2 Ml Nebu)  15 mcg IH Q12HRT [facility]


Budesonide (Budesonide 0.5 Mg/2 Ml Nebu)  0.5 mg IH Q12HRT [facility]


Docusate Sodium (Docusate Sodium 100 Mg Cap)  100 mg PO BID PRN


   PRN Reason: Constipation


Enoxaparin Sodium (Enoxaparin 40 Mg/0.4 Ml Inj)  40 mg SUB-Q QDAY@2200 TAL; P

rotocol


   Last Admin: 22 21:58 Dose:  40 mg


   


Famotidine (Famotidine 20 Mg/2 Ml Inj)  20 mg IV BID [facility]


   Last Admin: 22 11:08 Dose:  20 mg


   


Sodium Chloride (Nacl 0.9% 500 Ml)  500 mls @ 50 mls/hr IV AS DIRECT [facility]


Metoprolol Succinate (Metoprolol Succinate Xl 25 Mg Tab)  25 mg PO QDAY [facility]


   Last Admin: 22 11:09 Dose:  25 mg


   


Morphine Sulfate (Morphine 2 Mg/1 Ml Inj)  2 mg IV Q4H PRN


   PRN Reason: Pain, Moderate (4-6)


Naloxone HCl (Naloxone 0.4 Mg/1 Ml Inj)  0.1 mg IV Q2MIN PRN


   PRN Reason: Res Rate </= 8 or 02 SAT < 92%


Ondansetron HCl (Ondansetron 4 Mg/2 Ml Inj)  4 mg IV Q8H PRN


   PRN Reason: Nausea And Vomiting


Sodium Chloride (Sodium Chloride 0.9% 10 Ml Flush Syringe)  10 ml IV BID [facility]


   Last Admin: 22 11:09 Dose:  10 ml


   


Sodium Chloride (Sodium Chloride 0.9% 10 Ml Flush Syringe)  10 ml IV PRN PRN


   PRN Reason: LINE FLUSH


Tramadol HCl (Tramadol 50 Mg Tab)  50 mg PO Q4H PRN


   PRN Reason: Pain, Mild (1-3)











Physical Examination


Vital signs: 


                                   Vital Signs











Temp Pulse Resp BP Pulse Ox


 


 98.9 F   102 H  26 H  209/105   97 


 


 22 16:27  22 16:27  22 16:27  22 16:27  22 16:27











General appearance: no acute distress, alert, other (morbidly obese)


Neck: other (large in circumference)


Effort: mildly labored


Ascultation: Bilateral: diminished breath sounds


Percussion: Bilateral: not dull


Tactile fremitus: Bilateral: normal


Cardiovascular: regular rate and rhythm





Results





- Laboratory Findings


CBC and BMP: 


                                 22 05:34





                                 22 05:34


PT/INR, D-dimer











PT  13.7 Sec. (12.2-14.9)   22  05:34    


 


INR  0.95  (0.87-1.13)   22  05:34    








Abnormal lab findings: 


                                  Abnormal Labs











  22





  23:40 02:08 05:34


 


RBC   5.57 H  5.25 H


 


Hgb   15.4 H  14.8 H


 


Hct   46.4 H  43.6 H


 


Mono % (Auto)   14.9 H  10.4 H


 


Mono # (Auto)   1.1 H 


 


Glucose  224 H  


 


POC Glucose   


 


Triglycerides   


 


Cholesterol   


 


LDL Cholesterol Direct   














  22





  05:34 06:27


 


RBC  


 


Hgb  


 


Hct  


 


Mono % (Auto)  


 


Mono # (Auto)  


 


Glucose  169 H 


 


POC Glucose   182 H


 


Triglycerides  201 H 


 


Cholesterol  215 H 


 


LDL Cholesterol Direct  138 H 














- Diagnostic Findings


Chest x-ray: image reviewed





Assessment and Plan





Out patient follow up for full PFT, Walk test


HRCT here now


Thank you for consult.

## 2022-08-25 NOTE — PROGRESS NOTE
Assessment and Plan


Assessment and plan: 


--Chest pain:


Associated with shortness of breath, past history of tobacco use, family history

of premature coronary artery disease


Serial cardiac enzymes, serial EKG, echocardiogram for LV function ejection 

fraction


Cardiology evaluation noted and appreciated, possible left heart catheterization

tomorrow


N.p.o. from midnight





Cardiac cath 8/25/2022-left main patent.  LAD large in pain.  Circumflex large 

and patent.  RCA patent.  Per dynamic LV with Brockenbrough phenomenon with 

increased gradient of 60mm with PVC.  Recommend medical management.


Echo 8/24/2022-EF 65 to 70%.  Mild to moderate concentric LVH.  Mild diastolic 

dysfunction is present impaired relaxation pattern.  Aortic valve not well 

visualized.  Trace tricuspid regurgitation.





--Family history of premature coronary artery disease;


Important risk factor, cardiac work-up in progress





- Morbid obesity; BMI 48.8


Advised dietary modification, exercise as tolerated and weight reduction


When medically stable





-- Dyspnea;


Oxygen titrate O2 sats to more than 90%


Echocardiogram for LV function ejection fraction


Chest x-ray no acute abnormality noted





-- DVT prophylaxis; subcu Lovenox





-- Full CODE STATUS





Advance care planning; +30 minutes


I discussed with the patient her condition, tests and reports


Discussed the diagnosis, I discussed the significance of cardiac testing, 

echocardiogram, serial cardiac enzymes, serial EKG


The significance of left heart catheterization tomorrow, and the treatment plan.

 Patient had some questions


Answered all of them, patient verbalized understanding





Behavioral obesity weight reduction counseling 20 to 25 minutes


Advised dietary modification, exercise as tolerated and weight reduction





preventive health care counseling; +30 minutes


Strongly advised, diet modification, exercise as tolerated and weight reduction 

when you are medically stable


I also encouraged lifestyle changes, bariatric surgical consultation as 

outpatient for different options of weight reduction program


When you are medically stable, patient verbalized understanding





Closely monitor the patient and adjust the management as needed


Follow echocardiogram, heart cath tomorrow and adjust her management as needed


Plan of care reviewed with the patient and her nurse














History


Interval history: 


I have seen and examined the patient at the bedside this afternoon


Patient's chart and medications reviewed


Patient underwent left heart catheterization, nonobstructive coronaries


Echocardiogram findings reviewed


Patient denies any chest pain or shortness of breath


No new complaints








Hospitalist Physical





- Constitutional


Vitals: 


                                        











Temp Pulse Resp BP Pulse Ox


 


 97.5 F L  78   20   103/69   95 


 


 08/25/22 11:30  08/25/22 12:14  08/25/22 12:14  08/25/22 11:30  08/25/22 12:25











General appearance: Present: no acute distress, well-nourished, obese (Morbid 

obesity)





- EENT


Eyes: Present: PERRL, EOM intact





- Neck


Neck: Present: supple, normal ROM





- Respiratory


Respiratory effort: normal


Respiratory: bilateral: diminished, negative: rales, rhonchi, wheezing





- Cardiovascular


Rhythm: regular


Heart Sounds: Present: S1 & S2





- Extremities


Extremities: no ischemia, No edema





- Abdominal


General gastrointestinal: soft, non-tender, non-distended, normal bowel sounds





- Integumentary


Integumentary: Present: clear, warm





- Psychiatric


Psychiatric: appropriate mood/affect, cooperative





- Neurologic


Neurologic: moves all extremities





HEART Score





- HEART Score


EKG: Non-specific


Age: 45-65


Risk factors: 1-2 risk factors


Troponin: 


                                        











Troponin T  < 0.010 ng/mL (0.00-0.029)   08/24/22  03:48    











Troponin: < normal limit





- Critical Actions


Critical Actions: 4-6 pts:12-16.6% risk of adverse cardiac event. Should be 

admitted





Results





- Labs


CBC & Chem 7: 


                                 08/25/22 05:34





                                 08/25/22 05:34


Labs: 


                             Laboratory Last Values











WBC  5.7 K/mm3 (4.5-11.0)   08/25/22  05:34    


 


RBC  5.25 M/mm3 (3.65-5.03)  H  08/25/22  05:34    


 


Hgb  14.8 gm/dl (10.1-14.3)  H  08/25/22  05:34    


 


Hct  43.6 % (30.3-42.9)  H  08/25/22  05:34    


 


MCV  83 fl (79-97)   08/25/22  05:34    


 


MCH  28 pg (28-32)   08/25/22  05:34    


 


MCHC  34 % (30-34)   08/25/22  05:34    


 


RDW  13.9 % (13.2-15.2)   08/25/22  05:34    


 


Plt Count  198 K/mm3 (140-440)   08/25/22  05:34    


 


Lymph % (Auto)  32.0 % (13.4-35.0)   08/25/22  05:34    


 


Mono % (Auto)  10.4 % (0.0-7.3)  H  08/25/22  05:34    


 


Eos % (Auto)  4.3 % (0.0-4.3)   08/25/22  05:34    


 


Baso % (Auto)  0.5 % (0.0-1.8)   08/25/22  05:34    


 


Lymph # (Auto)  1.8 K/mm3 (1.2-5.4)   08/25/22  05:34    


 


Mono # (Auto)  0.6 K/mm3 (0.0-0.8)   08/25/22  05:34    


 


Eos # (Auto)  0.2 K/mm3 (0.0-0.4)   08/25/22  05:34    


 


Baso # (Auto)  0.0 K/mm3 (0.0-0.1)   08/25/22  05:34    


 


Seg Neutrophils %  52.8 % (40.0-70.0)   08/25/22  05:34    


 


Seg Neutrophils #  3.0 K/mm3 (1.8-7.7)   08/25/22  05:34    


 


PT  13.7 Sec. (12.2-14.9)   08/25/22  05:34    


 


INR  0.95  (0.87-1.13)   08/25/22  05:34    


 


APTT  33.5 Sec. (24.2-36.6)   08/25/22  05:34    


 


Sodium  141 mmol/L (137-145)   08/25/22  05:34    


 


Potassium  4.0 mmol/L (3.6-5.0)   08/25/22  05:34    


 


Chloride  101.3 mmol/L ()   08/25/22  05:34    


 


Carbon Dioxide  29 mmol/L (22-30)   08/25/22  05:34    


 


Anion Gap  15 mmol/L  08/25/22  05:34    


 


BUN  12 mg/dL (7-17)   08/25/22  05:34    


 


Creatinine  0.7 mg/dL (0.6-1.2)   08/25/22  05:34    


 


Estimated GFR  > 60 ml/min  08/25/22  05:34    


 


BUN/Creatinine Ratio  17 %  08/25/22  05:34    


 


Glucose  169 mg/dL ()  H  08/25/22  05:34    


 


POC Glucose  182 mg/dL ()  H  08/25/22  06:27    


 


Calcium  9.3 mg/dL (8.4-10.2)   08/25/22  05:34    


 


Total Bilirubin  0.30 mg/dL (0.1-1.2)   08/23/22  23:40    


 


AST  19 units/L (5-40)   08/23/22  23:40    


 


ALT  29 units/L (7-56)   08/23/22  23:40    


 


Alkaline Phosphatase  93 units/L ()   08/23/22  23:40    


 


Troponin T  < 0.010 ng/mL (0.00-0.029)   08/24/22  03:48    


 


NT-Pro-B Natriuret Pep  837.1 pg/mL (0-900)   08/24/22  06:41    


 


Total Protein  7.2 g/dL (6.3-8.2)   08/23/22  23:40    


 


Albumin  4.7 g/dL (3.9-5)   08/23/22  23:40    


 


Albumin/Globulin Ratio  1.9 %  08/23/22  23:40    


 


Triglycerides  201 mg/dL (2-149)  H  08/25/22  05:34    


 


Cholesterol  215 mg/dL ()  H  08/25/22  05:34    


 


LDL Cholesterol Direct  138 mg/dL ()  H  08/25/22  05:34    


 


HDL Cholesterol  49 mg/dL (40-59)   08/25/22  05:34    


 


Cholesterol/HDL Ratio  4.38 %  08/25/22  05:34    


 


TSH  1.840 mlU/mL (0.270-4.200)   08/25/22  05:34    


 


Free T4  1.07 ng/dL (0.76-1.46)   08/25/22  05:34    


 


Urine Color  L  (Yellow)   08/24/22  09:30    


 


Urine Turbidity  Cloudy  (Clear)   08/24/22  09:30    


 


Specific Gravity (Man)  1.020  (1.003-1.030)   08/24/22  09:30    


 


Ur Protein (Man)  Negative mg/dL (Negative)   08/24/22  09:30    


 


Ur Ketones (Man)  Negative  (Negative)   08/24/22  09:30    


 


Urine Bilirubin (Man)  Negative  (Negative)   08/24/22  09:30    


 


Urine WBC (Auto)  1.0 /HPF (0.0-6.0)   08/24/22  09:30    


 


Urine RBC (Auto)  8.0 /HPF (0.0-6.0)   08/24/22  09:30    


 


U Epithel Cells (Auto)  3.0 /HPF (0-13.0)   08/24/22  09:30    


 


Urine Bacteria (Auto)  1+ /HPF (Negative)   08/24/22  09:30    


 


Urine RBC (Manual)  Negative  (Negative)   08/24/22  09:30    


 


Urine Mucus  2+ /HPF  08/24/22  09:30    


 


Blood Type  A NEGATIVE   08/24/22  11:59    


 


Antibody Screen  Negative   08/24/22  11:59    











Floyd/IV: 


                                        





Voiding Method                   Toilet











Active Medications





- Current Medications


Current Medications: 














Generic Name Dose Route Start Last Admin





  Trade Name Freq  PRN Reason Stop Dose Admin


 


Acetaminophen  650 mg  08/24/22 12:34 





  Acetaminophen 325 Mg Tab  PO  





  Q4H PRN  





  Pain MILD(1-3)/Fever >100.5/HA  


 


Hydrocodone Bitart/Acetaminophen  1 each  08/25/22 10:00 





  Hydrocodone/Acetaminophen 5-325 Mg Tab  PO  





  Q4H PRN  





  Pain, Moderate (4-6)  


 


Albuterol  2.5 mg  08/25/22 13:00 





  Albuterol 2.5 Mg/3 Ml Nebu  IH  





  Q4HRT PRN  





  Shortness Of Breath  


 


Albuterol/Ipratropium  1 ampul  08/25/22 20:00 





  Ipratropium/Albuterol Sulfate 3 Ml Ampul.Neb  IH  





  TIDRT [facility]  


 


Arformoterol Tartrate  15 mcg  08/25/22 20:00 





  Arformoterol 15 Mcg/2 Ml Nebu  IH  





  Q12HRT [facility]  


 


Budesonide  0.5 mg  08/25/22 20:00 





  Budesonide 0.5 Mg/2 Ml Nebu  IH  





  Q12HRT [facility]  


 


Docusate Sodium  100 mg  08/24/22 12:34 





  Docusate Sodium 100 Mg Cap  PO  





  BID PRN  





  Constipation  


 


Enoxaparin Sodium  40 mg  08/24/22 22:00  08/24/22 21:58





  Enoxaparin 40 Mg/0.4 Ml Inj  SUB-Q   40 mg





  QDAY@2200 [facility]   Administration





  Protocol  


 


Famotidine  20 mg  08/24/22 22:00  08/25/22 11:08





  Famotidine 20 Mg/2 Ml Inj  IV   20 mg





  BID TAL   Administration


 


Sodium Chloride  500 mls @ 50 mls/hr  08/25/22 08:30 





  Nacl 0.9% 500 Ml  IV  





  AS DIRECT TAL  


 


Metoprolol Succinate  25 mg  08/25/22 10:00  08/25/22 11:09





  Metoprolol Succinate Xl 25 Mg Tab  PO   25 mg





  QDAY TAL   Administration


 


Morphine Sulfate  2 mg  08/24/22 12:34 





  Morphine 2 Mg/1 Ml Inj  IV  





  Q4H PRN  





  Pain, Moderate (4-6)  


 


Naloxone HCl  0.1 mg  08/24/22 12:34 





  Naloxone 0.4 Mg/1 Ml Inj  IV  





  Q2MIN PRN  





  Res Rate </= 8 or 02 SAT < 92%  


 


Ondansetron HCl  4 mg  08/24/22 12:34 





  Ondansetron 4 Mg/2 Ml Inj  IV  





  Q8H PRN  





  Nausea And Vomiting  


 


Sodium Chloride  10 ml  08/24/22 22:00  08/25/22 11:09





  Sodium Chloride 0.9% 10 Ml Flush Syringe  IV   10 ml





  BID TAL   Administration


 


Sodium Chloride  10 ml  08/24/22 12:34 





  Sodium Chloride 0.9% 10 Ml Flush Syringe  IV  





  PRN PRN  





  LINE FLUSH  


 


Tramadol HCl  50 mg  08/25/22 10:00 





  Tramadol 50 Mg Tab  PO  





  Q4H PRN  





  Pain, Mild (1-3)

## 2022-08-25 NOTE — ELECTROCARDIOGRAPH REPORT
[facility]

                                       

Test Date:    2022               Test Time:    06:43:26

Pat Name:     JOSE MIGUEL VALENCIA          Department:   

Patient ID:   [id number]          Room:         A460 1

Gender:       F                        Technician:   NANCY

:          1965               Requested By: ELISEO CURRIE

Order Number: [id number]             Reading MD:   Angel Saldivar

                                 Measurements

Intervals                              Axis          

Rate:         81                       P:            38

MO:           161                      QRS:          -47

QRSD:         114                      T:            124

QT:           413                                    

QTc:          480                                    

                           Interpretive Statements

Sinus rhythm

Probable left atrial enlargement

LVH with IVCD, LAD and secondary repol abnrm

Compared to ECG 2022 16:39:04

Intraventricular conduction delay now present

Electronically Signed On 2022 9:57:48 EDT by Angel Saldivar

## 2022-08-26 VITALS — SYSTOLIC BLOOD PRESSURE: 136 MMHG | DIASTOLIC BLOOD PRESSURE: 74 MMHG

## 2022-08-26 LAB
BUN SERPL-MCNC: 14 MG/DL (ref 7–17)
BUN/CREAT SERPL: 20 %
CALCIUM SERPL-MCNC: 9.1 MG/DL (ref 8.4–10.2)
HCO3 BLDA-SCNC: 28.4 MMOL/L (ref 20–26)
HEMOLYSIS INDEX: 9
PCO2 BLDA: 42.7 MM HG
PH BLDA: 7.44 PH UNITS (ref 7.35–7.45)
PO2 BLDA: 81.1 MM HG (ref 80–90)

## 2022-08-26 RX ADMIN — IPRATROPIUM BROMIDE AND ALBUTEROL SULFATE SCH AMPUL: .5; 3 SOLUTION RESPIRATORY (INHALATION) at 13:10

## 2022-08-26 RX ADMIN — METOPROLOL SUCCINATE SCH MG: 25 TABLET, FILM COATED, EXTENDED RELEASE ORAL at 09:33

## 2022-08-26 RX ADMIN — FAMOTIDINE SCH MG: 10 INJECTION, SOLUTION INTRAVENOUS at 09:33

## 2022-08-26 RX ADMIN — BUDESONIDE SCH MG: 0.5 INHALANT RESPIRATORY (INHALATION) at 08:03

## 2022-08-26 RX ADMIN — Medication SCH ML: at 13:05

## 2022-08-26 RX ADMIN — IPRATROPIUM BROMIDE AND ALBUTEROL SULFATE SCH: .5; 3 SOLUTION RESPIRATORY (INHALATION) at 08:03

## 2022-08-26 RX ADMIN — ARFORMOTEROL TARTRATE SCH MCG: 15 SOLUTION RESPIRATORY (INHALATION) at 08:03

## 2022-08-26 NOTE — DISCHARGE SUMMARY
Providers





- Providers


Date of Admission: 


08/24/22 12:35





Date of discharge: 08/26/22


Attending physician: 


AMY J KOCHERLA





                                        





08/24/22 11:51


Consult to Physician [CONS] Routine 


   Comment: 


   Consulting Provider: ALEXANDRA PERKINS


   Physician Instructions: 


   Reason For Exam: chest pain/SOB





08/25/22 09:33


Consult to Cardiac Rehabilitation [CONS] Routine 


   Reason For Exam: Cardiac Rehab Evaluation





08/25/22 09:34


Consult to Physician [CONS] Routine 


   Comment: 


   Consulting Provider: SAWYER BIRD


   Physician Instructions: 


   Reason For Exam: SOB











Primary care physician: 


PRIMARY CARE MD








Hospitalization


Condition: Stable


Hospital course: 


-- Atypical chest pain:


Associated with shortness of breath, past history of tobacco use, family history

 of premature coronary artery disease


Serial cardiac enzymes, serial EKG, echocardiogram for LV function ejection 

fraction


Cardiology evaluation noted and appreciated, possible left heart catheterization

 tomorrow


N.p.o. from midnight





Cardiac cath 8/25/2022-left main patent.  LAD large in pain.  Circumflex large 

and patent.  RCA patent.  Per dynamic LV with Brockenbrough phenomenon with 

increased gradient of 60mm with PVC.  Recommend medical management.


Echo 8/24/2022-EF 65 to 70%.  Mild to moderate concentric LVH.  Mild diastolic 

dysfunction is present impaired relaxation pattern.  Aortic valve not well 

visualized.  Trace tricuspid regurgitation.





--Gastroesophageal reflux disease[GERD]





-Family history of premature coronary artery disease;


Important risk factor, cardiac work-up in progress





- Morbid obesity; BMI 48.8


Advised dietary modification, exercise as tolerated and weight reduction


When medically stable





-- Dyspnea;


Oxygen titrate O2 sats to more than 90%


Echocardiogram for LV function ejection fraction


Chest x-ray no acute abnormality noted





-- Dyslipidemia





-- DVT prophylaxis; subcu Lovenox





-- Full CODE STATUS





Advance care planning; +30 minutes


I discussed with the patient her condition, tests and reports


Discussed the diagnosis, I discussed the significance of cardiac testing, 

echocardiogram, serial cardiac enzymes, serial EKG


The significance of left heart catheterization tomorrow, and the treatment plan.

  Patient had some questions


Answered all of them, patient verbalized understanding





Behavioral obesity weight reduction counseling 20 to 25 minutes


Advised dietary modification, exercise as tolerated and weight reduction





preventive health care counseling; +30 minutes


Strongly advised, diet modification, exercise as tolerated and weight reduction 

when you are medically stable


I also encouraged lifestyle changes, bariatric surgical consultation as 

outpatient for different options of weight reduction program


When you are medically stable, patient verbalized understanding





Closely monitor the patient and adjust the management as needed


Follow echocardiogram, heart cath tomorrow and adjust her management as needed


Plan of care reviewed with the patient and her nurse chest pain





Disposition: 01 HOME / SELF CARE / HOMELESS


Final Discharge Diagnosis (Prints w/discharge instructions): Chest pain atypical

 chest pain.  Gastroesophageal reflux disease.  Family history of premature 

coronary artery disease.  Dyspnea/shortness of breath.  Morbid obesity BMI 48.6.

  Dyslipidemia


Time spent for discharge: 35 minutes





Core Measure Documentation





- Palliative Care


Palliative Care/ Comfort Measures: Not Applicable





- Core Measures


Any of the following diagnoses?: none





Exam





- Constitutional


Vitals: 


                                        











Temp Pulse Resp BP Pulse Ox


 


 98.0 F   83   18   136/74   92 


 


 08/26/22 11:32  08/26/22 11:32  08/26/22 11:32  08/26/22 11:32  08/26/22 11:32











General appearance: Present: no acute distress, well-nourished, obese (Morbidly 

obese)





- EENT


Eyes: Present: PERRL, EOM intact





- Neck


Neck: Present: supple, normal ROM





- Respiratory


Respiratory effort: normal


Respiratory: bilateral: diminished, negative: rales, rhonchi





- Cardiovascular


Rhythm: regular


Heart Sounds: Present: S1 & S2





- Extremities


Extremities: no ischemia, No edema





- Abdominal


General gastrointestinal: Present: soft, non-tender, non-distended, normal bowel

 sounds





- Integumentary


Integumentary: Present: clear, warm





- Musculoskeletal


Musculoskeletal: strength equal bilaterally





- Psychiatric


Psychiatric: appropriate mood/affect, cooperative





- Neurologic


Neurologic: moves all extremities





Plan


Activity: advance as tolerated


Diet: other (Cardiac diet as tolerated)


Additional Instructions: Advised to follow pulmonologist Dr. Bird per 

schedule.  If you have worsening symptoms contact MD or go to the nearest 

emergency room as needed.  Advised dietary modification, exercise as tolerated 

and weight reduction when you are medically stable.  You need outpatient sleep 

study at pulmonologist office per schedule


Follow up with: 


MAURA BOGGS MD [Staff Physician] - 3-5 Days


PRIMARY CARE,MD [Primary Care Provider] - 3-5 Days


Prescriptions: 


Furosemide [Lasix] 20 mg PO QDAY #30 tablet


AtorvaSTATin [Lipitor] 20 mg PO QHS #30 tab


Metoprolol Xl [Metoprolol SUCCINATE ER TAB] 25 mg PO QDAY #30 tablet


Famotidine [Pepcid] 20 mg PO BID #30 tablet


Albuterol Mdi (or & Nicu Only) [ProAir HFA Inhaler] 2 puff IH QID PRN #8.5 gram


 PRN Reason: Shortness Of Breath

## 2022-08-26 NOTE — PROGRESS NOTE
Assessment and Plan








8/26/22:  Echo shows some mild diastolic dysfunction so would like patient to be

started on lasix.  Suggest walk test prior to discharge.  Do not agree with 

overall CT reading by rads. There is some ground glass but do not agree with the

septal thickening.  2 small nodules at the bases, no follow up necessary for 

those.  Will have patient follow up with full PFT and pending the result of walk

test in hospital may need repeat one in our office.  No objection to discharge 

today.  Will obtain ABG today prior to discharge as well to rule out OHS.  She 

meets part of the criteria as her BMI is greater than 30.  Likely will need 

outpatient sleep study as well.





Out patient follow up for full PFT, Walk test


HRCT here now


Thank you for consult.





Subjective


Date of service: 08/26/22


Principal diagnosis: Acute diastolic dysfunction, shortness of breath


Interval history: 





Reviewed CT and then reviewed report.  Called rads and spoke with our in house 

radiologist and they agree that read was over read.  Maybe 2 very small nodules 

in the lower lobes.  The remainder of the CT was unremarkable, however there is 

some ground glass present.





Objective


                               Vital Signs - 12hr











  08/26/22 08/26/22 08/26/22





  04:18 07:52 07:53


 


Temperature 98.0 F 97.8 F 


 


Pulse Rate 78  


 


Pulse Rate [   





Anterior   





Bilateral   





Throughout]   


 


Respiratory 18 16 





Rate   


 


Respiratory   





Rate [Anterior   





Bilateral   





Throughout]   


 


Blood Pressure 119/78 151/76 151/76


 


O2 Sat by Pulse 92  





Oximetry   














  08/26/22 08/26/22 08/26/22





  08:02 08:03 11:32


 


Temperature   98.0 F


 


Pulse Rate   83


 


Pulse Rate [  88 





Anterior   





Bilateral   





Throughout]   


 


Respiratory   18





Rate   


 


Respiratory  20 





Rate [Anterior   





Bilateral   





Throughout]   


 


Blood Pressure   136/74


 


O2 Sat by Pulse 100  92





Oximetry   











Constitutional: no acute distress, alert, other (morbidly obese)


Neck: other (large in circumference)


Effort: mildly labored


Ascultation: Bilateral: diminished breath sounds


Percussion: Bilateral: not dull


Tactile fremitus: Bilateral: normal


Cardiovascular: regular rate and rhythm


CBC and BMP: 


                                 08/25/22 05:34





                                 08/26/22 09:15


ABG, PT/INR, D-dimer: 


PT/INR, D-dimer











PT  13.7 Sec. (12.2-14.9)   08/25/22  05:34    


 


INR  0.95  (0.87-1.13)   08/25/22  05:34    








Abnormal lab findings: 


                                  Abnormal Labs











  08/23/22 08/24/22 08/25/22





  23:40 02:08 05:34


 


RBC   5.57 H  5.25 H


 


Hgb   15.4 H  14.8 H


 


Hct   46.4 H  43.6 H


 


Mono % (Auto)   14.9 H  10.4 H


 


Mono # (Auto)   1.1 H 


 


Glucose  224 H  


 


POC Glucose   


 


Triglycerides   


 


Cholesterol   


 


LDL Cholesterol Direct   














  08/25/22 08/25/22 08/26/22





  05:34 06:27 09:15


 


RBC   


 


Hgb   


 


Hct   


 


Mono % (Auto)   


 


Mono # (Auto)   


 


Glucose  169 H   201 H


 


POC Glucose   182 H 


 


Triglycerides  201 H  


 


Cholesterol  215 H  


 


LDL Cholesterol Direct  138 H

## 2022-08-27 NOTE — ELECTROCARDIOGRAPH REPORT
[facility]

                                       

Test Date:    2022               Test Time:    10:19:20

Pat Name:     JOSE MIGUEL VALENCIA          Department:   

Patient ID:   [id number]          Room:         A460 1

Gender:       F                        Technician:   NANCY

:          1965               Requested By: NASREEN SINGH

Order Number: [id number]             Reading MD:   Martin Aguilar

                                 Measurements

Intervals                              Axis          

Rate:         76                       P:            39

MA:           159                      QRS:          -13

QRSD:         112                      T:            141

QT:           404                                    

QTc:          455                                    

                           Interpretive Statements

Sinus rhythm

Probable left atrial enlargement

LVH with secondary repolarization abnormality

Compared to ECG 2022 06:43:26

Intraventricular conduction delay no longer present

Electronically Signed On 2022 9:10:17 EDT by Martin Aguilar